# Patient Record
Sex: FEMALE | Race: BLACK OR AFRICAN AMERICAN | Employment: FULL TIME | URBAN - METROPOLITAN AREA
[De-identification: names, ages, dates, MRNs, and addresses within clinical notes are randomized per-mention and may not be internally consistent; named-entity substitution may affect disease eponyms.]

---

## 2019-03-21 ENCOUNTER — OFFICE VISIT (OUTPATIENT)
Dept: OBGYN CLINIC | Age: 29
End: 2019-03-21

## 2019-03-21 ENCOUNTER — HOSPITAL ENCOUNTER (OUTPATIENT)
Dept: LAB | Age: 29
Discharge: HOME | End: 2019-03-21

## 2019-03-21 VITALS
DIASTOLIC BLOOD PRESSURE: 58 MMHG | SYSTOLIC BLOOD PRESSURE: 99 MMHG | WEIGHT: 103.8 LBS | HEART RATE: 69 BPM | BODY MASS INDEX: 17.29 KG/M2 | RESPIRATION RATE: 20 BRPM | HEIGHT: 65 IN | OXYGEN SATURATION: 100 %

## 2019-03-21 DIAGNOSIS — N92.0 MENORRHAGIA WITH REGULAR CYCLE: ICD-10-CM

## 2019-03-21 DIAGNOSIS — N92.0 MENORRHAGIA WITH REGULAR CYCLE: Primary | ICD-10-CM

## 2019-03-21 PROCEDURE — 99001 SPECIMEN HANDLING PT-LAB: CPT

## 2019-03-21 RX ORDER — ONDANSETRON 4 MG/1
4 TABLET, ORALLY DISINTEGRATING ORAL
Qty: 30 TAB | Refills: 1 | Status: SHIPPED | OUTPATIENT
Start: 2019-03-21

## 2019-03-21 RX ORDER — NORETHINDRONE ACETATE AND ETHINYL ESTRADIOL 1MG-20(21)
1 KIT ORAL DAILY
Qty: 2 PACKAGE | Refills: 0 | Status: SHIPPED | OUTPATIENT
Start: 2019-03-21

## 2019-03-21 NOTE — PROGRESS NOTES
1. Have you been to the ER, urgent care clinic since your last visit? Hospitalized since your last visit? Yes    2. Have you seen or consulted any other health care providers outside of the [address] [place] [address] since your last visit? Include any pap smears or colon screening.  No

## 2019-03-21 NOTE — PROGRESS NOTES
Subjective:      Beena Mckeon is a 34 y.o. female who complains of irregular menses. She reports having normal menstrual cycles until this current cycle where she has been changing tampons much more frequently than normal. She went to the ER yesterday due to feeling lightheaded. There was no indication that her hemoglobin was checked. She has a urine screen which was unremarkable. She was given a prescription for provera, which she has not yet taken. Her UPT is negative. She is not currently on any form of contraception. Her menses are normally light and manageable. Patient Active Problem List   Diagnosis Code    UTI (urinary tract infection) N39.0    Abnormal uterine bleeding (AUB) N93.9     Patient Active Problem List    Diagnosis Date Noted    Abnormal uterine bleeding (AUB) 06/09/2016    UTI (urinary tract infection) 11/08/2012     Current Outpatient Medications   Medication Sig Dispense Refill    norethindrone-ethinyl estradiol (JUNEL FE 1/20) 1 mg-20 mcg (21)/75 mg (7) tab Take 1 Tab by mouth daily. OCP taper; 3 pills x 3 days, 2 pills x 2 days; 1 pill daily thereafter 2 Package 0    ondansetron (ZOFRAN ODT) 4 mg disintegrating tablet Take 1 Tab by mouth every eight (8) hours as needed for Nausea. 30 Tab 1    ibuprofen (MOTRIN) 200 mg tablet Take  by mouth.  HYDROcodone-acetaminophen (LORTAB) 7.5-500 mg/15 mL oral solution Take 10 mL by mouth four (4) times daily as needed for Pain. Max Daily Amount: 40 mL. 200 mL 0     No Known Allergies  Past Medical History:   Diagnosis Date    Anemia      History reviewed. No pertinent surgical history. Review of Systems    A comprehensive review of systems was negative except for that written in the HPI.      Objective:     Visit Vitals  BP 99/58   Pulse 69   Resp 20   Ht 5' 5\" (1.651 m)   Wt 103 lb 12.8 oz (47.1 kg)   LMP 03/21/2019   SpO2 100%   BMI 17.27 kg/m²      Physical Exam:   General appearance - alert, well appearing, and in no distress  Pelvic - normal external genitalia, vulva, vagina, cervix, uterus and adnexa, active bleeding noted; possible posterior fibroid appreciated (?)     Assessment/Plan:       ICD-10-CM ICD-9-CM    1. Menorrhagia with regular cycle N92.0 626.2 US PELV NON OB W TV      norethindrone-ethinyl estradiol (JUNEL FE 1/20) 1 mg-20 mcg (21)/75 mg (7) tab      ondansetron (ZOFRAN ODT) 4 mg disintegrating tablet      CBC WITH AUTOMATED DIFF     Encounter Diagnoses   Name Primary?  Menorrhagia with regular cycle Yes     Orders Placed This Encounter    US PELV NON OB W TV    CBC WITH AUTOMATED DIFF    norethindrone-ethinyl estradiol (JUNEL FE 1/20) 1 mg-20 mcg (21)/75 mg (7) tab    ondansetron (ZOFRAN ODT) 4 mg disintegrating tablet     Follow-up and Dispositions    · Return in about 2 weeks (around 4/4/2019) for Ultrasound Follow-up.

## 2019-03-22 LAB
BASOPHILS # BLD AUTO: 0 X10E3/UL (ref 0–0.2)
BASOPHILS NFR BLD AUTO: 0 %
EOSINOPHIL # BLD AUTO: 0.1 X10E3/UL (ref 0–0.4)
EOSINOPHIL NFR BLD AUTO: 1 %
ERYTHROCYTE [DISTWIDTH] IN BLOOD BY AUTOMATED COUNT: 15 % (ref 12.3–15.4)
HCT VFR BLD AUTO: 29.2 % (ref 34–46.6)
HGB BLD-MCNC: 9.7 G/DL (ref 11.1–15.9)
IMM GRANULOCYTES # BLD AUTO: 0 X10E3/UL (ref 0–0.1)
IMM GRANULOCYTES NFR BLD AUTO: 0 %
LYMPHOCYTES # BLD AUTO: 2.5 X10E3/UL (ref 0.7–3.1)
LYMPHOCYTES NFR BLD AUTO: 32 %
MCH RBC QN AUTO: 29 PG (ref 26.6–33)
MCHC RBC AUTO-ENTMCNC: 33.2 G/DL (ref 31.5–35.7)
MCV RBC AUTO: 87 FL (ref 79–97)
MONOCYTES # BLD AUTO: 0.5 X10E3/UL (ref 0.1–0.9)
MONOCYTES NFR BLD AUTO: 6 %
NEUTROPHILS # BLD AUTO: 4.8 X10E3/UL (ref 1.4–7)
NEUTROPHILS NFR BLD AUTO: 61 %
PLATELET # BLD AUTO: 265 X10E3/UL (ref 150–379)
RBC # BLD AUTO: 3.35 X10E6/UL (ref 3.77–5.28)
WBC # BLD AUTO: 8 X10E3/UL (ref 3.4–10.8)

## 2019-11-06 ENCOUNTER — TELEPHONE (OUTPATIENT)
Dept: OBGYN CLINIC | Age: 29
End: 2019-11-06

## 2019-11-06 DIAGNOSIS — N93.9 VAGINAL BLEEDING: Primary | ICD-10-CM

## 2019-11-06 RX ORDER — NORETHINDRONE ACETATE AND ETHINYL ESTRADIOL 1MG-20(21)
1 KIT ORAL DAILY
Qty: 3 PACKAGE | Refills: 3 | Status: SHIPPED | OUTPATIENT
Start: 2019-11-06

## 2019-11-06 NOTE — TELEPHONE ENCOUNTER
Patient would like a refill on the Junel. She has been bleeding for over a month. She almost passed out yesterday due to being weak and tired.   I scheduled an apt for her on 11-